# Patient Record
Sex: MALE | Race: ASIAN | Employment: FULL TIME | URBAN - METROPOLITAN AREA
[De-identification: names, ages, dates, MRNs, and addresses within clinical notes are randomized per-mention and may not be internally consistent; named-entity substitution may affect disease eponyms.]

---

## 2017-01-26 ENCOUNTER — OFFICE VISIT (OUTPATIENT)
Dept: FAMILY MEDICINE CLINIC | Facility: CLINIC | Age: 32
End: 2017-01-26

## 2017-01-26 VITALS
TEMPERATURE: 99 F | SYSTOLIC BLOOD PRESSURE: 126 MMHG | HEART RATE: 91 BPM | HEIGHT: 67 IN | WEIGHT: 183.13 LBS | DIASTOLIC BLOOD PRESSURE: 70 MMHG | BODY MASS INDEX: 28.74 KG/M2 | OXYGEN SATURATION: 98 % | RESPIRATION RATE: 16 BRPM

## 2017-01-26 DIAGNOSIS — A08.4 VIRAL GASTROENTERITIS: Primary | ICD-10-CM

## 2017-01-26 PROCEDURE — 99213 OFFICE O/P EST LOW 20 MIN: CPT | Performed by: FAMILY MEDICINE

## 2017-01-26 RX ORDER — ONDANSETRON 4 MG/1
4 TABLET, FILM COATED ORAL EVERY 8 HOURS PRN
Qty: 20 TABLET | Refills: 0 | Status: SHIPPED | OUTPATIENT
Start: 2017-01-26 | End: 2018-01-30 | Stop reason: ALTCHOICE

## 2017-01-26 NOTE — PROGRESS NOTES
HPI:    Patient ID: Shaila Haque is a 33year old male. HPI  57-year-old male presents with complaints of nausea and numerous episodes of loose/watery diarrhea ×1 day. Denies any blood in stools. No vomiting.   Has been trying to push fluid sounds normal. No respiratory distress. He has no wheezes. He has no rales. Abdominal: Soft. Bowel sounds are normal. He exhibits no distension. There is no tenderness. There is no rebound and no guarding.    Neurological: He is alert and oriented to pers

## 2017-01-26 NOTE — PATIENT INSTRUCTIONS
Treating Diarrhea    Diarrhea happens when you have loose, watery, or frequent bowel movements. It is a common problem with many causes. Most cases of diarrhea clear up on their own. But certain cases may need treatment.  Be sure to see your healthcare pr © 3511-9662 [address], [address] [place]. All rights reserved. This information is not intended as a substitute for professional medical care. Always follow your healthcare professional's instructions.         Viral G · Wash your hands after using cutting boards, countertops, knives, or utensils that have been in contact with raw food. · Keep uncooked meats away from cooked and ready-to-eat foods.   Medicine  You may use acetaminophen or NSAID medicines like ibuprofen o During the next 24 hours (the second day), you may add the following to the above:  · Hot cereal, plain toast, bread, rolls, and crackers  · Plain noodles, rice, mashed potatoes, chicken noodle or rice soup  · Unsweetened canned fruit (avoid pineapple), ba © 6821-2418 [address], [address] [place]. All rights reserved. This information is not intended as a substitute for professional medical care. Always follow your healthcare professional's instructions.

## 2018-01-26 ENCOUNTER — TELEPHONE (OUTPATIENT)
Dept: FAMILY MEDICINE CLINIC | Facility: CLINIC | Age: 33
End: 2018-01-26

## 2018-01-26 NOTE — TELEPHONE ENCOUNTER
Pt wants to schedule mole removal for next week said he discussed this with  previously?  does pt need to come back to consult or can I just schedule removal?

## 2018-01-30 ENCOUNTER — OFFICE VISIT (OUTPATIENT)
Dept: FAMILY MEDICINE CLINIC | Facility: CLINIC | Age: 33
End: 2018-01-30

## 2018-01-30 VITALS
HEART RATE: 81 BPM | RESPIRATION RATE: 18 BRPM | OXYGEN SATURATION: 98 % | WEIGHT: 204 LBS | TEMPERATURE: 98 F | BODY MASS INDEX: 32.02 KG/M2 | HEIGHT: 67 IN | SYSTOLIC BLOOD PRESSURE: 134 MMHG | DIASTOLIC BLOOD PRESSURE: 82 MMHG

## 2018-01-30 DIAGNOSIS — D22.9 CHANGE IN MOLE: ICD-10-CM

## 2018-01-30 DIAGNOSIS — D22.72 ATYPICAL NEVUS OF LEFT LOWER LEG: Primary | ICD-10-CM

## 2018-01-30 PROCEDURE — 99213 OFFICE O/P EST LOW 20 MIN: CPT | Performed by: FAMILY MEDICINE

## 2018-01-30 PROCEDURE — 88305 TISSUE EXAM BY PATHOLOGIST: CPT | Performed by: FAMILY MEDICINE

## 2018-01-30 PROCEDURE — 11401 EXC TR-EXT B9+MARG 0.6-1 CM: CPT | Performed by: FAMILY MEDICINE

## 2018-01-30 RX ORDER — LIDOCAINE AND PRILOCAINE 25; 25 MG/G; MG/G
CREAM TOPICAL
COMMUNITY
Start: 2018-01-29 | End: 2018-05-21 | Stop reason: ALTCHOICE

## 2018-01-31 NOTE — PROGRESS NOTES
HPI:    Patient ID: Jose Kasper is a 56 [address] old male. HPI  31yr old male presents with concerns about changing mole on his LLE. States he has had it for months but has noted it change in size and have variation in color.  Denies any pain, i complications  - specimen sent to pathology  - post procedure care instructions discussed with pt  - pt understands and agrees with tx plan  - RTC in 8-10d for path review and suture removal, sooner if needed      Orders Placed This Encounter      Specimen

## 2018-03-06 ENCOUNTER — TELEPHONE (OUTPATIENT)
Dept: FAMILY MEDICINE CLINIC | Facility: CLINIC | Age: 33
End: 2018-03-06

## 2018-05-21 ENCOUNTER — OFFICE VISIT (OUTPATIENT)
Dept: FAMILY MEDICINE CLINIC | Facility: CLINIC | Age: 33
End: 2018-05-21

## 2018-05-21 VITALS
HEART RATE: 77 BPM | OXYGEN SATURATION: 99 % | RESPIRATION RATE: 16 BRPM | HEIGHT: 67.5 IN | TEMPERATURE: 99 F | SYSTOLIC BLOOD PRESSURE: 134 MMHG | DIASTOLIC BLOOD PRESSURE: 82 MMHG | BODY MASS INDEX: 31.24 KG/M2 | WEIGHT: 201.38 LBS

## 2018-05-21 DIAGNOSIS — J30.2 SEASONAL ALLERGIC RHINITIS, UNSPECIFIED TRIGGER: ICD-10-CM

## 2018-05-21 DIAGNOSIS — R06.83 SNORING: ICD-10-CM

## 2018-05-21 DIAGNOSIS — Z00.00 LABORATORY EXAMINATION ORDERED AS PART OF A COMPLETE PHYSICAL EXAMINATION: ICD-10-CM

## 2018-05-21 DIAGNOSIS — J32.2 CHRONIC ETHMOIDAL SINUSITIS: ICD-10-CM

## 2018-05-21 DIAGNOSIS — G47.30 SLEEP DISORDER BREATHING: ICD-10-CM

## 2018-05-21 DIAGNOSIS — Z00.00 ROUTINE GENERAL MEDICAL EXAMINATION AT A HEALTH CARE FACILITY: Primary | ICD-10-CM

## 2018-05-21 DIAGNOSIS — E66.9 OBESITY (BMI 30.0-34.9): ICD-10-CM

## 2018-05-21 DIAGNOSIS — Z13.21 ENCOUNTER FOR VITAMIN DEFICIENCY SCREENING: ICD-10-CM

## 2018-05-21 PROCEDURE — 99395 PREV VISIT EST AGE 18-39: CPT | Performed by: FAMILY MEDICINE

## 2018-05-21 RX ORDER — FLUTICASONE PROPIONATE 50 MCG
SPRAY, SUSPENSION (ML) NASAL
COMMUNITY
Start: 2018-05-07 | End: 2019-08-26 | Stop reason: ALTCHOICE

## 2018-05-21 NOTE — PROGRESS NOTES
Patient presents with:  Physical:    Snoring: takes afrin      HPI:  26yr old obese male with chronic sinus symptoms presents for routine adult physical, c/o fatigue and snoring. Chronic sinusitis, takes antihistamine and flonase regularly.  Occasionally status: Never Smoker                                                              Smokeless tobacco: Never Used                      Alcohol use:  No                  Current Outpatient Prescriptions:  Fluticasone Propionate 50 MCG/ACT Nasal Suspension  Dis 30.0-34. 9)  Chronic ethmoidal sinusitis  Seasonal allergic rhinitis, unspecified trigger  - reviewed healthy eating habits, regular exercise, BMI goals, weight loss, skin care and preventative guidelines based on age  - will check fasting labs  - snoring/s

## 2018-05-22 ENCOUNTER — LAB ENCOUNTER (OUTPATIENT)
Dept: LAB | Age: 33
End: 2018-05-22
Attending: FAMILY MEDICINE
Payer: COMMERCIAL

## 2018-05-22 DIAGNOSIS — Z13.21 ENCOUNTER FOR VITAMIN DEFICIENCY SCREENING: ICD-10-CM

## 2018-05-22 DIAGNOSIS — E66.9 OBESITY (BMI 30.0-34.9): ICD-10-CM

## 2018-05-22 DIAGNOSIS — Z00.00 LABORATORY EXAMINATION ORDERED AS PART OF A COMPLETE PHYSICAL EXAMINATION: ICD-10-CM

## 2018-05-22 PROCEDURE — 85025 COMPLETE CBC W/AUTO DIFF WBC: CPT

## 2018-05-22 PROCEDURE — 36415 COLL VENOUS BLD VENIPUNCTURE: CPT

## 2018-05-22 PROCEDURE — 84443 ASSAY THYROID STIM HORMONE: CPT

## 2018-05-22 PROCEDURE — 80053 COMPREHEN METABOLIC PANEL: CPT

## 2018-05-22 PROCEDURE — 82306 VITAMIN D 25 HYDROXY: CPT

## 2018-05-22 PROCEDURE — 80061 LIPID PANEL: CPT

## 2018-05-25 ENCOUNTER — TELEPHONE (OUTPATIENT)
Dept: FAMILY MEDICINE CLINIC | Facility: CLINIC | Age: 33
End: 2018-05-25

## 2018-05-25 NOTE — TELEPHONE ENCOUNTER
----- Message from [facility], DO sent at 5/25/2018  1:27 PM CDT -----  Pls have pt f/u on labs and plan of care.

## 2018-06-14 ENCOUNTER — HOSPITAL ENCOUNTER (OUTPATIENT)
Dept: GENERAL RADIOLOGY | Facility: HOSPITAL | Age: 33
Discharge: HOME | End: 2018-06-14
Attending: PREVENTIVE MEDICINE

## 2018-06-14 ENCOUNTER — EMPLOYEE HEALTH (OUTPATIENT)
Dept: OTHER | Facility: HOSPITAL | Age: 33
End: 2018-06-14
Attending: PREVENTIVE MEDICINE

## 2018-06-14 DIAGNOSIS — Z11.1 SCREENING-PULMONARY TB: ICD-10-CM

## 2018-06-14 DIAGNOSIS — Z11.1 SCREENING-PULMONARY TB: Primary | ICD-10-CM

## 2018-07-06 NOTE — TELEPHONE ENCOUNTER
Pt did not come back to discuss his labs    PLease try contacting pt again to schedule    No future appointments.

## 2018-07-10 ENCOUNTER — APPOINTMENT (OUTPATIENT)
Dept: LAB | Age: 33
End: 2018-07-10
Attending: FAMILY MEDICINE
Payer: COMMERCIAL

## 2018-07-10 ENCOUNTER — OFFICE VISIT (OUTPATIENT)
Dept: FAMILY MEDICINE CLINIC | Facility: CLINIC | Age: 33
End: 2018-07-10

## 2018-07-10 VITALS
HEIGHT: 67.5 IN | DIASTOLIC BLOOD PRESSURE: 64 MMHG | WEIGHT: 197.63 LBS | SYSTOLIC BLOOD PRESSURE: 118 MMHG | OXYGEN SATURATION: 98 % | TEMPERATURE: 98 F | BODY MASS INDEX: 30.66 KG/M2 | HEART RATE: 74 BPM | RESPIRATION RATE: 16 BRPM

## 2018-07-10 DIAGNOSIS — Z82.49 FAMILY HISTORY OF PREMATURE CAD: ICD-10-CM

## 2018-07-10 DIAGNOSIS — Z11.1 SCREENING FOR TUBERCULOSIS: ICD-10-CM

## 2018-07-10 DIAGNOSIS — E55.9 VITAMIN D DEFICIENCY: ICD-10-CM

## 2018-07-10 DIAGNOSIS — E78.2 MIXED HYPERLIPIDEMIA: Primary | ICD-10-CM

## 2018-07-10 DIAGNOSIS — E66.9 OBESITY (BMI 30.0-34.9): ICD-10-CM

## 2018-07-10 DIAGNOSIS — Z31.41 FERTILITY TESTING: ICD-10-CM

## 2018-07-10 DIAGNOSIS — Z92.29 HISTORY OF BCG VACCINATION: ICD-10-CM

## 2018-07-10 PROCEDURE — 36415 COLL VENOUS BLD VENIPUNCTURE: CPT

## 2018-07-10 PROCEDURE — 99214 OFFICE O/P EST MOD 30 MIN: CPT | Performed by: FAMILY MEDICINE

## 2018-07-10 PROCEDURE — 86480 TB TEST CELL IMMUN MEASURE: CPT

## 2018-07-10 RX ORDER — ERGOCALCIFEROL 1.25 MG/1
50000 CAPSULE ORAL WEEKLY
Qty: 12 CAPSULE | Refills: 0 | Status: SHIPPED | OUTPATIENT
Start: 2018-07-10 | End: 2018-08-09

## 2018-07-10 RX ORDER — ROSUVASTATIN CALCIUM 5 MG/1
5 TABLET, COATED ORAL NIGHTLY
Qty: 30 TABLET | Refills: 2 | Status: SHIPPED | OUTPATIENT
Start: 2018-07-10 | End: 2019-07-15 | Stop reason: ALTCHOICE

## 2018-07-10 RX ORDER — ACETAMINOPHEN 160 MG
2000 TABLET,DISINTEGRATING ORAL DAILY
COMMUNITY
End: 2019-07-29 | Stop reason: ALTCHOICE

## 2018-07-10 NOTE — PROGRESS NOTES
HPI:    Patient ID: Vijaya Fox is a 26year old male. HPI   33yr old obese, male presents for f/u on previous lab results and states he had a positive PPD test done at occupational health with neg CXR.  He needs TB gold done for further enoch Skin: Skin is warm and dry. Psychiatric: He has a normal mood and affect. His behavior is normal.   Nursing note and vitals reviewed. ASSESSMENT/PLAN:   1. Mixed hyperlipidemia  2. Family history of premature CAD  - lipid panel (5/22/18):  Corinne Teran

## 2018-07-13 LAB
M TB TUBERC IFN-G BLD QL: NEGATIVE
M TB TUBERC IFN-G/MITOGEN IGNF BLD: <0 IU/ML
M TB TUBERC IGNF/MITOGEN IGNF CONTROL: >10 IU/ML
MITOGEN IGNF BCKGRD COR BLD-ACNC: 0.03 IU/ML

## 2018-07-17 ENCOUNTER — TELEPHONE (OUTPATIENT)
Dept: FAMILY MEDICINE CLINIC | Facility: CLINIC | Age: 33
End: 2018-07-17

## 2018-07-17 NOTE — TELEPHONE ENCOUNTER
----- Message from [facility], DO sent at 7/13/2018 11:46 AM CDT -----  Pls inform pt that TB gold neg.

## 2019-07-03 ENCOUNTER — EMPLOYEE HEALTH (OUTPATIENT)
Dept: OTHER | Facility: HOSPITAL | Age: 34
End: 2019-07-03
Attending: PREVENTIVE MEDICINE

## 2019-07-03 DIAGNOSIS — Z11.1 SCREENING-PULMONARY TB: Primary | ICD-10-CM

## 2019-07-03 PROCEDURE — 86480 TB TEST CELL IMMUN MEASURE: CPT

## 2019-07-09 LAB
M TB TUBERC IFN-G BLD QL: NEGATIVE
M TB TUBERC IFN-G/MITOGEN IGNF BLD: 0.02 IU/ML
M TB TUBERC IFN-G/MITOGEN IGNF BLD: 0.03 IU/ML
M TB TUBERC IGNF/MITOGEN IGNF CONTROL: >10 IU/ML
MITOGEN IGNF BCKGRD COR BLD-ACNC: 0.05 IU/ML

## 2019-07-15 ENCOUNTER — OFFICE VISIT (OUTPATIENT)
Dept: FAMILY MEDICINE CLINIC | Facility: CLINIC | Age: 34
End: 2019-07-15
Payer: COMMERCIAL

## 2019-07-15 VITALS
BODY MASS INDEX: 31.18 KG/M2 | DIASTOLIC BLOOD PRESSURE: 88 MMHG | RESPIRATION RATE: 18 BRPM | SYSTOLIC BLOOD PRESSURE: 132 MMHG | WEIGHT: 201 LBS | OXYGEN SATURATION: 98 % | TEMPERATURE: 98 F | HEART RATE: 69 BPM | HEIGHT: 67.5 IN

## 2019-07-15 DIAGNOSIS — Z00.00 ROUTINE GENERAL MEDICAL EXAMINATION AT A HEALTH CARE FACILITY: Primary | ICD-10-CM

## 2019-07-15 DIAGNOSIS — Z00.00 LABORATORY EXAM ORDERED AS PART OF ROUTINE GENERAL MEDICAL EXAMINATION: ICD-10-CM

## 2019-07-15 DIAGNOSIS — E55.9 VITAMIN D DEFICIENCY: ICD-10-CM

## 2019-07-15 DIAGNOSIS — E66.9 OBESITY (BMI 30.0-34.9): ICD-10-CM

## 2019-07-15 DIAGNOSIS — J30.2 SEASONAL ALLERGIC RHINITIS, UNSPECIFIED TRIGGER: ICD-10-CM

## 2019-07-15 DIAGNOSIS — R73.01 IMPAIRED FASTING GLUCOSE: ICD-10-CM

## 2019-07-15 DIAGNOSIS — E78.2 MIXED HYPERLIPIDEMIA: ICD-10-CM

## 2019-07-15 PROCEDURE — 99395 PREV VISIT EST AGE 18-39: CPT | Performed by: FAMILY MEDICINE

## 2019-07-15 NOTE — PATIENT INSTRUCTIONS
Prevention Guidelines, Men Ages 25 to 44  Screening tests and vaccines are an important part of managing your health. A screening test is done to find possible disorders or diseases in people who don't have any symptoms.  The goal is to find a disease ear Vaccines Who needs it How often   Chickenpox (varicella) All men in this age group who have no record of this infection or vaccine 2 doses; the second dose should be given at least 4 weeks after the first dose   Hepatitis A Men at increased risk for infect Sexually transmitted infection prevention Men who are sexually active At routine exams   Skin cancer Prevention of skin cancer in fair-skinned adults through age 25 At routine exams   1Those who are 25years of age, who are not up-to-date on their childhoo Total cholesterol includes LDL and HDL cholesterol, as well as other fats in the bloodstream. If your total cholesterol is high, follow the steps below to help lower your total cholesterol level:  Eat less unhealthy fat  · Cut back on saturated fats and tr Many people need medicine to get their LDL levels to a safe level. Medicine to lower cholesterol levels is effective and safe. Taking medicine is not a substitute for exercise or watching your diet!  Your healthcare provider can tell you whether you might b

## 2019-07-15 NOTE — PROGRESS NOTES
Patient presents with:  Physical      HPI:  34yr old obese, male with HL, vit d deficiency and allergic rhinitis presents for routine adult physical.   Obesity, has not been watching his diet nor exercising. Eating out often.   HL, had tried statin for a fe No        Current Outpatient Medications:  Vitamin D3 2000 units Oral Cap Take 2,000 Units by mouth daily.  Disp:  Rfl:    Fluticasone Propionate 50 MCG/ACT Nasal Suspension  Disp:  Rfl:          Shrimp                  ANAPHYLAXIS    Health Maintenance: preventative guidelines based on age  - will check fasting labs  - HL, low fat/chol diet and regular exercise reiterated, will check lipid panel  - check a1c for previous elevated fasting glucose at 100  - cont vit d3 2000u daily otc  - allergic rhinitis,

## 2019-07-18 ENCOUNTER — LAB ENCOUNTER (OUTPATIENT)
Dept: LAB | Age: 34
End: 2019-07-18
Attending: FAMILY MEDICINE
Payer: COMMERCIAL

## 2019-07-18 DIAGNOSIS — Z00.00 LABORATORY EXAM ORDERED AS PART OF ROUTINE GENERAL MEDICAL EXAMINATION: ICD-10-CM

## 2019-07-18 DIAGNOSIS — E55.9 VITAMIN D DEFICIENCY: ICD-10-CM

## 2019-07-18 DIAGNOSIS — R73.01 IMPAIRED FASTING GLUCOSE: ICD-10-CM

## 2019-07-18 DIAGNOSIS — E78.2 MIXED HYPERLIPIDEMIA: ICD-10-CM

## 2019-07-18 LAB
ALBUMIN SERPL-MCNC: 4.1 G/DL (ref 3.4–5)
ALBUMIN/GLOB SERPL: 1 {RATIO} (ref 1–2)
ALP LIVER SERPL-CCNC: 65 U/L (ref 45–117)
ALT SERPL-CCNC: 61 U/L (ref 16–61)
ANION GAP SERPL CALC-SCNC: 6 MMOL/L (ref 0–18)
AST SERPL-CCNC: 27 U/L (ref 15–37)
BASOPHILS # BLD AUTO: 0.07 X10(3) UL (ref 0–0.2)
BASOPHILS NFR BLD AUTO: 1 %
BILIRUB SERPL-MCNC: 0.5 MG/DL (ref 0.1–2)
BUN BLD-MCNC: 13 MG/DL (ref 7–18)
BUN/CREAT SERPL: 12.7 (ref 10–20)
CALCIUM BLD-MCNC: 9.3 MG/DL (ref 8.5–10.1)
CHLORIDE SERPL-SCNC: 104 MMOL/L (ref 98–112)
CHOLEST SMN-MCNC: 206 MG/DL (ref ?–200)
CO2 SERPL-SCNC: 29 MMOL/L (ref 21–32)
CREAT BLD-MCNC: 1.02 MG/DL (ref 0.7–1.3)
DEPRECATED RDW RBC AUTO: 40.8 FL (ref 35.1–46.3)
EOSINOPHIL # BLD AUTO: 0.19 X10(3) UL (ref 0–0.7)
EOSINOPHIL NFR BLD AUTO: 2.7 %
ERYTHROCYTE [DISTWIDTH] IN BLOOD BY AUTOMATED COUNT: 12.9 % (ref 11–15)
EST. AVERAGE GLUCOSE BLD GHB EST-MCNC: 117 MG/DL (ref 68–126)
GLOBULIN PLAS-MCNC: 4.1 G/DL (ref 2.8–4.4)
GLUCOSE BLD-MCNC: 95 MG/DL (ref 70–99)
HBA1C MFR BLD HPLC: 5.7 % (ref ?–5.7)
HCT VFR BLD AUTO: 47.4 % (ref 39–53)
HDLC SERPL-MCNC: 47 MG/DL (ref 40–59)
HGB BLD-MCNC: 16 G/DL (ref 13–17.5)
IMM GRANULOCYTES # BLD AUTO: 0.02 X10(3) UL (ref 0–1)
IMM GRANULOCYTES NFR BLD: 0.3 %
LDLC SERPL CALC-MCNC: 128 MG/DL (ref ?–100)
LYMPHOCYTES # BLD AUTO: 3.05 X10(3) UL (ref 1–4)
LYMPHOCYTES NFR BLD AUTO: 43.3 %
M PROTEIN MFR SERPL ELPH: 8.2 G/DL (ref 6.4–8.2)
MCH RBC QN AUTO: 29.4 PG (ref 26–34)
MCHC RBC AUTO-ENTMCNC: 33.8 G/DL (ref 31–37)
MCV RBC AUTO: 87 FL (ref 80–100)
MONOCYTES # BLD AUTO: 0.46 X10(3) UL (ref 0.1–1)
MONOCYTES NFR BLD AUTO: 6.5 %
NEUTROPHILS # BLD AUTO: 3.26 X10 (3) UL (ref 1.5–7.7)
NEUTROPHILS # BLD AUTO: 3.26 X10(3) UL (ref 1.5–7.7)
NEUTROPHILS NFR BLD AUTO: 46.2 %
NONHDLC SERPL-MCNC: 159 MG/DL (ref ?–130)
OSMOLALITY SERPL CALC.SUM OF ELEC: 288 MOSM/KG (ref 275–295)
PLATELET # BLD AUTO: 257 10(3)UL (ref 150–450)
POTASSIUM SERPL-SCNC: 4.1 MMOL/L (ref 3.5–5.1)
RBC # BLD AUTO: 5.45 X10(6)UL (ref 4.3–5.7)
SODIUM SERPL-SCNC: 139 MMOL/L (ref 136–145)
T4 FREE SERPL-MCNC: 1.1 NG/DL (ref 0.8–1.7)
TRIGL SERPL-MCNC: 157 MG/DL (ref 30–149)
TSI SER-ACNC: 3.87 MIU/ML (ref 0.36–3.74)
VIT D+METAB SERPL-MCNC: 15.6 NG/ML (ref 30–100)
VLDLC SERPL CALC-MCNC: 31 MG/DL (ref 0–30)
WBC # BLD AUTO: 7.1 X10(3) UL (ref 4–11)

## 2019-07-18 PROCEDURE — 83036 HEMOGLOBIN GLYCOSYLATED A1C: CPT

## 2019-07-18 PROCEDURE — 80053 COMPREHEN METABOLIC PANEL: CPT

## 2019-07-18 PROCEDURE — 84443 ASSAY THYROID STIM HORMONE: CPT

## 2019-07-18 PROCEDURE — 80061 LIPID PANEL: CPT

## 2019-07-18 PROCEDURE — 82306 VITAMIN D 25 HYDROXY: CPT

## 2019-07-18 PROCEDURE — 36415 COLL VENOUS BLD VENIPUNCTURE: CPT

## 2019-07-18 PROCEDURE — 85025 COMPLETE CBC W/AUTO DIFF WBC: CPT

## 2019-07-18 PROCEDURE — 84439 ASSAY OF FREE THYROXINE: CPT

## 2019-07-29 ENCOUNTER — OFFICE VISIT (OUTPATIENT)
Dept: FAMILY MEDICINE CLINIC | Facility: CLINIC | Age: 34
End: 2019-07-29
Payer: COMMERCIAL

## 2019-07-29 VITALS
OXYGEN SATURATION: 97 % | BODY MASS INDEX: 31.02 KG/M2 | WEIGHT: 200 LBS | SYSTOLIC BLOOD PRESSURE: 120 MMHG | RESPIRATION RATE: 18 BRPM | TEMPERATURE: 98 F | HEART RATE: 67 BPM | HEIGHT: 67.5 IN | DIASTOLIC BLOOD PRESSURE: 88 MMHG

## 2019-07-29 DIAGNOSIS — R79.89 ABNORMAL TSH: ICD-10-CM

## 2019-07-29 DIAGNOSIS — E55.9 VITAMIN D DEFICIENCY: ICD-10-CM

## 2019-07-29 DIAGNOSIS — R73.03 PREDIABETES: ICD-10-CM

## 2019-07-29 DIAGNOSIS — E66.9 OBESITY (BMI 30.0-34.9): Primary | ICD-10-CM

## 2019-07-29 DIAGNOSIS — E78.2 MIXED HYPERLIPIDEMIA: ICD-10-CM

## 2019-07-29 PROCEDURE — 93000 ELECTROCARDIOGRAM COMPLETE: CPT | Performed by: FAMILY MEDICINE

## 2019-07-29 PROCEDURE — 99214 OFFICE O/P EST MOD 30 MIN: CPT | Performed by: FAMILY MEDICINE

## 2019-07-29 RX ORDER — PHENTERMINE HYDROCHLORIDE 15 MG/1
15 CAPSULE ORAL EVERY MORNING
Qty: 30 CAPSULE | Refills: 0 | Status: SHIPPED | OUTPATIENT
Start: 2019-07-29 | End: 2019-08-26 | Stop reason: DRUGHIGH

## 2019-07-29 RX ORDER — ERGOCALCIFEROL 1.25 MG/1
50000 CAPSULE ORAL WEEKLY
Qty: 4 CAPSULE | Refills: 2 | Status: SHIPPED | OUTPATIENT
Start: 2019-07-29 | End: 2019-08-28

## 2019-07-29 NOTE — PATIENT INSTRUCTIONS
Controlling Your Cholesterol  Cholesterol is a waxy substance. It travels in your blood through the blood vessels. When you have high cholesterol, it can build up along the walls of the blood vessels.  This makes the vessels narrower and decreases blood f · Choose an activity you enjoy. Walking, swimming, and riding a bike are some good ways to be active. · Start at a level where you feel comfortable. Increase your time and pace a little each week.   · Work up to 30 to 40 minutes of moderate to high intensi Exercise with a friend. When activity is fun, you're more likely to stick with it. Calories and weight loss  · Calories are the fuel your body burns for energy. You get the calories you need from the food you eat.  For healthy weight loss, women should ea © 0450-5358 [address]. [address] [facility] [place]. All rights reserved. This information is not intended as a substitute for professional medical care. Always follow your healthcare professional's instructions.

## 2019-07-29 NOTE — PROGRESS NOTES
HPI:    Patient ID: Josh Barlow is a 37year old male. HPI   34yr old male presents for f/u on recent labs and wt gain. Obesity, has been struggling to lose weight. States he [employer]. \" Working 2 full-time jobs and doing school, RACHEL Garcia phentermine  - will start phentermine 15mg po qam, reviewed indications, dosing and SEs, instructed to stop phentermine if he develops any shortness of breath, chest pain, swelling of ankles and feet, flluttering of heart, confusion, and high blood pressur

## 2019-08-26 ENCOUNTER — OFFICE VISIT (OUTPATIENT)
Dept: FAMILY MEDICINE CLINIC | Facility: CLINIC | Age: 34
End: 2019-08-26
Payer: COMMERCIAL

## 2019-08-26 VITALS
DIASTOLIC BLOOD PRESSURE: 80 MMHG | OXYGEN SATURATION: 99 % | HEART RATE: 68 BPM | TEMPERATURE: 98 F | HEIGHT: 67.5 IN | WEIGHT: 195 LBS | SYSTOLIC BLOOD PRESSURE: 135 MMHG | BODY MASS INDEX: 30.25 KG/M2 | RESPIRATION RATE: 18 BRPM

## 2019-08-26 DIAGNOSIS — K21.9 GASTROESOPHAGEAL REFLUX DISEASE, ESOPHAGITIS PRESENCE NOT SPECIFIED: ICD-10-CM

## 2019-08-26 DIAGNOSIS — E66.9 OBESITY (BMI 30.0-34.9): Primary | ICD-10-CM

## 2019-08-26 DIAGNOSIS — Z51.81 THERAPEUTIC DRUG MONITORING: ICD-10-CM

## 2019-08-26 PROCEDURE — 99214 OFFICE O/P EST MOD 30 MIN: CPT | Performed by: FAMILY MEDICINE

## 2019-08-26 RX ORDER — PHENTERMINE HYDROCHLORIDE 37.5 MG/1
37.5 CAPSULE ORAL EVERY MORNING
Qty: 30 CAPSULE | Refills: 0 | Status: SHIPPED | OUTPATIENT
Start: 2019-08-26 | End: 2019-09-23

## 2019-08-26 NOTE — PROGRESS NOTES
HPI:    Patient ID: Connie Vidal is a 37year old male. HPI   34yr old male presents for f/u on his obesity, refill on phentermine and c/o heartburn. Obesity, has been taking phentermine 15mg po daily without any significant SEs.  Minimal dry fatou reviewed indications, dosing and SEs  - advised to increase hydration, regular exercise and healthier eating habits  - monitor for any SEs  - Phentermine HCl 37.5 MG Oral Cap; Take 1 capsule (37.5 mg total) by mouth every morning. Dispense: 30 capsule;  Re

## 2019-09-23 ENCOUNTER — OFFICE VISIT (OUTPATIENT)
Dept: FAMILY MEDICINE CLINIC | Facility: CLINIC | Age: 34
End: 2019-09-23
Payer: COMMERCIAL

## 2019-09-23 VITALS
TEMPERATURE: 98 F | DIASTOLIC BLOOD PRESSURE: 78 MMHG | HEART RATE: 82 BPM | SYSTOLIC BLOOD PRESSURE: 134 MMHG | RESPIRATION RATE: 20 BRPM | WEIGHT: 183 LBS | BODY MASS INDEX: 28.39 KG/M2 | OXYGEN SATURATION: 98 % | HEIGHT: 67.5 IN

## 2019-09-23 DIAGNOSIS — E66.3 OVERWEIGHT (BMI 25.0-29.9): Primary | ICD-10-CM

## 2019-09-23 DIAGNOSIS — Z51.81 THERAPEUTIC DRUG MONITORING: ICD-10-CM

## 2019-09-23 DIAGNOSIS — L21.9 SEBORRHEIC DERMATITIS: ICD-10-CM

## 2019-09-23 PROCEDURE — 99214 OFFICE O/P EST MOD 30 MIN: CPT | Performed by: FAMILY MEDICINE

## 2019-09-23 RX ORDER — KETOCONAZOLE 20 MG/ML
SHAMPOO TOPICAL
Qty: 120 ML | Refills: 1 | Status: SHIPPED | OUTPATIENT
Start: 2019-09-23 | End: 2019-11-25

## 2019-09-23 RX ORDER — PHENTERMINE HYDROCHLORIDE 37.5 MG/1
37.5 CAPSULE ORAL EVERY MORNING
Qty: 30 CAPSULE | Refills: 0 | Status: SHIPPED | OUTPATIENT
Start: 2019-09-23 | End: 2019-10-21

## 2019-09-23 RX ORDER — ERGOCALCIFEROL 1.25 MG/1
CAPSULE ORAL
Refills: 2 | COMMUNITY
Start: 2019-08-30 | End: 2020-10-23

## 2019-09-23 NOTE — PROGRESS NOTES
HPI:    Patient ID: Lawrence Mijares is a 37year old male. HPI   34yr old male presents for f/u on wt loss and c/o dry scalp. Has been working on wt loss with phentermine.  Taking phentermine 37.5mg daily, which has helped suppress his appetite sign Phentermine HCl 37.5 MG Oral Cap; Take 1 capsule (37.5 mg total) by mouth every morning. Dispense: 30 capsule; Refill: 0    3.  Seborrheic dermatitis  - cont current shampoo for seborrheic dermatitis  - will tx with ketoconazole shampoo, reviewed indicatio

## 2019-10-21 ENCOUNTER — OFFICE VISIT (OUTPATIENT)
Dept: FAMILY MEDICINE CLINIC | Facility: CLINIC | Age: 34
End: 2019-10-21
Payer: COMMERCIAL

## 2019-10-21 VITALS
WEIGHT: 184.5 LBS | BODY MASS INDEX: 28.62 KG/M2 | HEIGHT: 67.5 IN | SYSTOLIC BLOOD PRESSURE: 126 MMHG | RESPIRATION RATE: 16 BRPM | OXYGEN SATURATION: 99 % | HEART RATE: 102 BPM | TEMPERATURE: 98 F | DIASTOLIC BLOOD PRESSURE: 82 MMHG

## 2019-10-21 DIAGNOSIS — L21.9 SEBORRHEIC DERMATITIS: ICD-10-CM

## 2019-10-21 DIAGNOSIS — Z51.81 THERAPEUTIC DRUG MONITORING: ICD-10-CM

## 2019-10-21 DIAGNOSIS — K21.9 GASTROESOPHAGEAL REFLUX DISEASE, ESOPHAGITIS PRESENCE NOT SPECIFIED: ICD-10-CM

## 2019-10-21 DIAGNOSIS — E66.3 OVERWEIGHT (BMI 25.0-29.9): Primary | ICD-10-CM

## 2019-10-21 PROCEDURE — 99214 OFFICE O/P EST MOD 30 MIN: CPT | Performed by: FAMILY MEDICINE

## 2019-10-21 RX ORDER — PHENTERMINE HYDROCHLORIDE 37.5 MG/1
37.5 CAPSULE ORAL EVERY MORNING
Qty: 30 CAPSULE | Refills: 0 | Status: SHIPPED | OUTPATIENT
Start: 2019-10-21 | End: 2019-11-25

## 2019-10-21 NOTE — PROGRESS NOTES
HPI:    Patient ID: Varghese Coppola is a 37year old male. HPI   34yr old male presents for f/u on overweight, seborrheic dermatitis and GERD. Has been taking phentermine but admits to missing a few doses. No significant SEs noted.  Mother-in-law vi provide refill for phentermine 37.5mg po qam, no significant SEs noted  - monitor for any SEs  - Phentermine HCl 37.5 MG Oral Cap; Take 1 capsule (37.5 mg total) by mouth every morning. Dispense: 30 capsule; Refill: 0    3.  Seborrheic dermatitis  - improv

## 2019-11-25 ENCOUNTER — OFFICE VISIT (OUTPATIENT)
Dept: FAMILY MEDICINE CLINIC | Facility: CLINIC | Age: 34
End: 2019-11-25
Payer: COMMERCIAL

## 2019-11-25 VITALS
WEIGHT: 179.5 LBS | HEART RATE: 72 BPM | DIASTOLIC BLOOD PRESSURE: 80 MMHG | SYSTOLIC BLOOD PRESSURE: 124 MMHG | HEIGHT: 67 IN | RESPIRATION RATE: 16 BRPM | OXYGEN SATURATION: 97 % | BODY MASS INDEX: 28.17 KG/M2 | TEMPERATURE: 98 F

## 2019-11-25 DIAGNOSIS — L21.9 SEBORRHEIC DERMATITIS: ICD-10-CM

## 2019-11-25 DIAGNOSIS — E66.3 OVERWEIGHT (BMI 25.0-29.9): Primary | ICD-10-CM

## 2019-11-25 DIAGNOSIS — Z51.81 THERAPEUTIC DRUG MONITORING: ICD-10-CM

## 2019-11-25 DIAGNOSIS — B37.0 ORAL THRUSH: ICD-10-CM

## 2019-11-25 PROCEDURE — 99214 OFFICE O/P EST MOD 30 MIN: CPT | Performed by: FAMILY MEDICINE

## 2019-11-25 RX ORDER — CLOTRIMAZOLE 10 MG/1
10 LOZENGE ORAL; TOPICAL
Qty: 70 TABLET | Refills: 0 | Status: SHIPPED | OUTPATIENT
Start: 2019-11-25 | End: 2019-12-09

## 2019-11-25 RX ORDER — KETOCONAZOLE 20 MG/ML
SHAMPOO TOPICAL
Qty: 120 ML | Refills: 2 | Status: SHIPPED | OUTPATIENT
Start: 2019-11-25 | End: 2020-05-03

## 2019-11-25 RX ORDER — PHENTERMINE HYDROCHLORIDE 37.5 MG/1
37.5 CAPSULE ORAL EVERY MORNING
Qty: 30 CAPSULE | Refills: 0 | Status: SHIPPED | OUTPATIENT
Start: 2019-11-25 | End: 2019-12-23

## 2019-11-26 NOTE — PROGRESS NOTES
HPI:    Patient ID: Cristal Abebe is a 37year old male. HPI   34yr old male presents for f/u on wt loss, seborrheic dermatitis and c/o thrush. Taking phentermine daily to help with wt loss. Denies any SEs from medication.  Has been helping him c significant SEs noted   - advised healthy eating habits and regular exercise  - will monitor and likely only one more month of medication after this month  - Phentermine HCl 37.5 MG Oral Cap; Take 1 capsule (37.5 mg total) by mouth every morning.   Dispense

## 2019-12-23 ENCOUNTER — OFFICE VISIT (OUTPATIENT)
Dept: FAMILY MEDICINE CLINIC | Facility: CLINIC | Age: 34
End: 2019-12-23
Payer: COMMERCIAL

## 2019-12-23 VITALS
SYSTOLIC BLOOD PRESSURE: 120 MMHG | RESPIRATION RATE: 16 BRPM | BODY MASS INDEX: 27.78 KG/M2 | TEMPERATURE: 98 F | WEIGHT: 177 LBS | DIASTOLIC BLOOD PRESSURE: 80 MMHG | HEIGHT: 67 IN | OXYGEN SATURATION: 99 % | HEART RATE: 72 BPM

## 2019-12-23 DIAGNOSIS — E55.9 VITAMIN D DEFICIENCY: ICD-10-CM

## 2019-12-23 DIAGNOSIS — E66.3 OVERWEIGHT (BMI 25.0-29.9): ICD-10-CM

## 2019-12-23 DIAGNOSIS — R79.89 ABNORMAL TSH: ICD-10-CM

## 2019-12-23 DIAGNOSIS — R73.03 PREDIABETES: ICD-10-CM

## 2019-12-23 DIAGNOSIS — Z51.81 THERAPEUTIC DRUG MONITORING: Primary | ICD-10-CM

## 2019-12-23 DIAGNOSIS — E78.2 MIXED HYPERLIPIDEMIA: ICD-10-CM

## 2019-12-23 PROCEDURE — 99214 OFFICE O/P EST MOD 30 MIN: CPT | Performed by: FAMILY MEDICINE

## 2019-12-23 RX ORDER — PHENTERMINE HYDROCHLORIDE 37.5 MG/1
37.5 CAPSULE ORAL EVERY MORNING
Qty: 30 CAPSULE | Refills: 0 | Status: SHIPPED | OUTPATIENT
Start: 2019-12-23 | End: 2020-04-06

## 2019-12-23 NOTE — PATIENT INSTRUCTIONS
Controlling Your Cholesterol  Cholesterol is a waxy substance. It travels in your blood through the blood vessels. When you have high cholesterol, it can build up along the walls of the blood vessels.  This makes the vessels narrower and decreases blood f · Choose an activity you enjoy. Walking, swimming, and riding a bike are some good ways to be active. · Start at a level where you feel comfortable. Increase your time and pace a little each week.   · Work up to 30 to 40 minutes of moderate to high intensi You have been diagnosed with prediabetes. This means that the level of sugar (glucose) in your blood is too high. If you have prediabetes, you are at risk for developing type 2 diabetes.  Type 2 diabetes is diagnosed when the level of glucose in the blood r · Fasting glucose test. Blood is taken and tested after you have fasted (not eaten) for at least 8 hours. A normal test result is 99 milligrams per deciliter (mg/dL) or lower. Prediabetes is 100 mg/dL to 125 mg/dL. Diabetes is 126 mg/dL or higher.   · Gluco · Lose weight for no reason  · Feel numbness or tingling in your fingers or toes  · Have cuts or bruises that don’t seem to heal  · Have blurry vision  Date Last Reviewed: 5/1/2016  © 4712-2713 [address].  [address] [place] [address], [address]

## 2019-12-24 NOTE — PROGRESS NOTES
HPI:    Patient ID: Vanessa Miramontes is a 37year old male. HPI   34yr old male presents for f/u on his wt loss and chronic conditions. Has been taking phentermine for the past month. Denies any SEs from medication.    HL and prediabetes, working on provided  - cont healthy eating habits and regular exercise  - Phentermine HCl 37.5 MG Oral Cap; Take 1 capsule (37.5 mg total) by mouth every morning. Dispense: 30 capsule; Refill: 0    3.  Mixed hyperlipidemia  - cont low fat/chol diet and regular exerci

## 2020-01-17 ENCOUNTER — APPOINTMENT (OUTPATIENT)
Dept: LAB | Age: 35
End: 2020-01-17
Attending: FAMILY MEDICINE
Payer: COMMERCIAL

## 2020-01-17 DIAGNOSIS — R79.89 ABNORMAL TSH: ICD-10-CM

## 2020-01-17 DIAGNOSIS — E55.9 VITAMIN D DEFICIENCY: ICD-10-CM

## 2020-01-17 DIAGNOSIS — R73.03 PREDIABETES: ICD-10-CM

## 2020-01-17 DIAGNOSIS — E78.2 MIXED HYPERLIPIDEMIA: ICD-10-CM

## 2020-01-17 LAB
ALBUMIN SERPL-MCNC: 4 G/DL (ref 3.4–5)
ALBUMIN/GLOB SERPL: 0.9 {RATIO} (ref 1–2)
ALP LIVER SERPL-CCNC: 101 U/L (ref 45–117)
ALT SERPL-CCNC: 115 U/L (ref 16–61)
ANION GAP SERPL CALC-SCNC: 3 MMOL/L (ref 0–18)
AST SERPL-CCNC: 39 U/L (ref 15–37)
BILIRUB SERPL-MCNC: 0.4 MG/DL (ref 0.1–2)
BUN BLD-MCNC: 12 MG/DL (ref 7–18)
BUN/CREAT SERPL: 12 (ref 10–20)
CALCIUM BLD-MCNC: 9.4 MG/DL (ref 8.5–10.1)
CHLORIDE SERPL-SCNC: 106 MMOL/L (ref 98–112)
CHOLEST SMN-MCNC: 215 MG/DL (ref ?–200)
CO2 SERPL-SCNC: 31 MMOL/L (ref 21–32)
CREAT BLD-MCNC: 1 MG/DL (ref 0.7–1.3)
EST. AVERAGE GLUCOSE BLD GHB EST-MCNC: 111 MG/DL (ref 68–126)
GLOBULIN PLAS-MCNC: 4.5 G/DL (ref 2.8–4.4)
GLUCOSE BLD-MCNC: 89 MG/DL (ref 70–99)
HBA1C MFR BLD HPLC: 5.5 % (ref ?–5.7)
HDLC SERPL-MCNC: 48 MG/DL (ref 40–59)
LDLC SERPL CALC-MCNC: 133 MG/DL (ref ?–100)
M PROTEIN MFR SERPL ELPH: 8.5 G/DL (ref 6.4–8.2)
NONHDLC SERPL-MCNC: 167 MG/DL (ref ?–130)
OSMOLALITY SERPL CALC.SUM OF ELEC: 289 MOSM/KG (ref 275–295)
PATIENT FASTING Y/N/NP: YES
PATIENT FASTING Y/N/NP: YES
POTASSIUM SERPL-SCNC: 3.7 MMOL/L (ref 3.5–5.1)
SODIUM SERPL-SCNC: 140 MMOL/L (ref 136–145)
THYROGLOB SERPL-MCNC: >500 U/ML (ref ?–60)
THYROPEROXIDASE AB SERPL-ACNC: 5091 U/ML (ref ?–60)
TRIGL SERPL-MCNC: 170 MG/DL (ref 30–149)
TSI SER-ACNC: 3.74 MIU/ML (ref 0.36–3.74)
VIT D+METAB SERPL-MCNC: 24.2 NG/ML (ref 30–100)
VLDLC SERPL CALC-MCNC: 34 MG/DL (ref 0–30)

## 2020-01-17 PROCEDURE — 82306 VITAMIN D 25 HYDROXY: CPT

## 2020-01-17 PROCEDURE — 84443 ASSAY THYROID STIM HORMONE: CPT

## 2020-01-17 PROCEDURE — 80053 COMPREHEN METABOLIC PANEL: CPT

## 2020-01-17 PROCEDURE — 86376 MICROSOMAL ANTIBODY EACH: CPT

## 2020-01-17 PROCEDURE — 80074 ACUTE HEPATITIS PANEL: CPT | Performed by: FAMILY MEDICINE

## 2020-01-17 PROCEDURE — 80061 LIPID PANEL: CPT

## 2020-01-17 PROCEDURE — 86800 THYROGLOBULIN ANTIBODY: CPT

## 2020-01-17 PROCEDURE — 83036 HEMOGLOBIN GLYCOSYLATED A1C: CPT

## 2020-01-20 ENCOUNTER — OFFICE VISIT (OUTPATIENT)
Dept: FAMILY MEDICINE CLINIC | Facility: CLINIC | Age: 35
End: 2020-01-20
Payer: COMMERCIAL

## 2020-01-20 VITALS
RESPIRATION RATE: 16 BRPM | DIASTOLIC BLOOD PRESSURE: 80 MMHG | WEIGHT: 181.25 LBS | SYSTOLIC BLOOD PRESSURE: 130 MMHG | OXYGEN SATURATION: 99 % | BODY MASS INDEX: 28.45 KG/M2 | HEART RATE: 84 BPM | HEIGHT: 67 IN | TEMPERATURE: 98 F

## 2020-01-20 DIAGNOSIS — E78.2 MIXED HYPERLIPIDEMIA: Primary | ICD-10-CM

## 2020-01-20 DIAGNOSIS — R79.89 ELEVATED LFTS: ICD-10-CM

## 2020-01-20 DIAGNOSIS — E66.3 OVERWEIGHT (BMI 25.0-29.9): ICD-10-CM

## 2020-01-20 DIAGNOSIS — R79.89 ABNORMAL TSH: ICD-10-CM

## 2020-01-20 DIAGNOSIS — E55.9 VITAMIN D DEFICIENCY: ICD-10-CM

## 2020-01-20 DIAGNOSIS — R73.03 PREDIABETES: ICD-10-CM

## 2020-01-20 DIAGNOSIS — Z82.49 FAMILY HISTORY OF PREMATURE CAD: ICD-10-CM

## 2020-01-20 LAB
HAV IGM SER QL: NONREACTIVE
HBV CORE IGM SER QL: NONREACTIVE
HBV SURFACE AG SERPL QL IA: NONREACTIVE
HCV AB SERPL QL IA: NONREACTIVE

## 2020-01-20 PROCEDURE — 99214 OFFICE O/P EST MOD 30 MIN: CPT | Performed by: FAMILY MEDICINE

## 2020-01-20 RX ORDER — ROSUVASTATIN CALCIUM 5 MG/1
5 TABLET, COATED ORAL NIGHTLY
Qty: 30 TABLET | Refills: 2 | Status: SHIPPED | OUTPATIENT
Start: 2020-01-20 | End: 2020-05-03

## 2020-01-20 RX ORDER — FLUTICASONE PROPIONATE 50 MCG
2 SPRAY, SUSPENSION (ML) NASAL DAILY
COMMUNITY

## 2020-01-21 NOTE — PROGRESS NOTES
HPI:    Patient ID: Corin Villarreal is a 37year old male. HPI   34yr old male presents for f/u on recent labs. Previous labs demonstrated HL, prediabetes and abnl TSH. Had repeat testing and here for results.      Review of Systems   Constitutional total) by mouth nightly. Dispense: 30 tablet; Refill: 2    3. Elevated LFTs  - mild elevation in LFTs  - will check hepatitis panel and RUQ US  - monitor   - HEPATITIS PANEL, ACUTE (4)  - US ABDOMEN LIMITED (CPT=76705); Future    4.  Prediabetes  - a1c 5.5

## 2020-04-06 DIAGNOSIS — E66.9 OBESITY (BMI 30-39.9): ICD-10-CM

## 2020-04-06 DIAGNOSIS — Z51.81 THERAPEUTIC DRUG MONITORING: ICD-10-CM

## 2020-04-06 RX ORDER — PHENTERMINE HYDROCHLORIDE 37.5 MG/1
37.5 CAPSULE ORAL EVERY MORNING
Qty: 30 CAPSULE | Refills: 0 | Status: SHIPPED | OUTPATIENT
Start: 2020-04-06 | End: 2020-05-06

## 2020-05-03 DIAGNOSIS — E66.9 OBESITY (BMI 30-39.9): ICD-10-CM

## 2020-05-03 DIAGNOSIS — Z51.81 THERAPEUTIC DRUG MONITORING: ICD-10-CM

## 2020-05-03 DIAGNOSIS — L21.9 SEBORRHEIC DERMATITIS: ICD-10-CM

## 2020-05-03 DIAGNOSIS — Z82.49 FAMILY HISTORY OF PREMATURE CAD: ICD-10-CM

## 2020-05-03 DIAGNOSIS — E78.2 MIXED HYPERLIPIDEMIA: ICD-10-CM

## 2020-05-03 RX ORDER — PHENTERMINE HYDROCHLORIDE 37.5 MG/1
37.5 CAPSULE ORAL EVERY MORNING
Qty: 30 CAPSULE | Refills: 0 | Status: CANCELLED | OUTPATIENT
Start: 2020-05-03

## 2020-05-05 DIAGNOSIS — L21.9 SEBORRHEIC DERMATITIS: ICD-10-CM

## 2020-05-05 DIAGNOSIS — E66.9 OBESITY (BMI 30-39.9): ICD-10-CM

## 2020-05-05 DIAGNOSIS — E78.2 MIXED HYPERLIPIDEMIA: ICD-10-CM

## 2020-05-05 DIAGNOSIS — Z82.49 FAMILY HISTORY OF PREMATURE CAD: ICD-10-CM

## 2020-05-05 DIAGNOSIS — Z51.81 THERAPEUTIC DRUG MONITORING: ICD-10-CM

## 2020-05-05 RX ORDER — PHENTERMINE HYDROCHLORIDE 37.5 MG/1
37.5 CAPSULE ORAL EVERY MORNING
Qty: 30 CAPSULE | Refills: 0 | Status: CANCELLED | OUTPATIENT
Start: 2020-05-05

## 2020-05-05 NOTE — TELEPHONE ENCOUNTER
LOV 1/20/2020    Asked at that time to follow up as below    RTC in 3mo, sooner if needed     Please call to schedule visit.

## 2020-05-06 ENCOUNTER — TELEMEDICINE (OUTPATIENT)
Dept: FAMILY MEDICINE CLINIC | Facility: CLINIC | Age: 35
End: 2020-05-06

## 2020-05-06 VITALS — WEIGHT: 189 LBS | BODY MASS INDEX: 30 KG/M2

## 2020-05-06 DIAGNOSIS — Z51.81 THERAPEUTIC DRUG MONITORING: ICD-10-CM

## 2020-05-06 PROCEDURE — 99213 OFFICE O/P EST LOW 20 MIN: CPT | Performed by: FAMILY MEDICINE

## 2020-05-06 RX ORDER — PHENTERMINE HYDROCHLORIDE 37.5 MG/1
37.5 CAPSULE ORAL EVERY MORNING
Qty: 30 CAPSULE | Refills: 0 | Status: SHIPPED | OUTPATIENT
Start: 2020-05-06 | End: 2020-10-23

## 2020-05-06 NOTE — PROGRESS NOTES
Video visit  Please note that the following visit was completed using two-way, real-time interactive audio and/or video communication.   This has been done in good lu to provide continuity of care in the best interest of the provider-patient relationship scalp twice weekly 120 mL 2   • ergocalciferol 06755 units Oral Cap Take 1 capsule (50,000 Units total) by mouth once a week.   2     Allergies:  Shrimp                  ANAPHYLAXIS   PHYSICAL EXAM:   Physical Exam   Constitutional: He is oriented to person

## 2020-05-07 RX ORDER — ROSUVASTATIN CALCIUM 5 MG/1
5 TABLET, COATED ORAL NIGHTLY
Qty: 30 TABLET | Refills: 2 | OUTPATIENT
Start: 2020-05-07

## 2020-05-07 RX ORDER — KETOCONAZOLE 20 MG/ML
SHAMPOO TOPICAL
Qty: 120 ML | Refills: 2 | OUTPATIENT
Start: 2020-05-07

## 2020-05-07 RX ORDER — KETOCONAZOLE 20 MG/ML
SHAMPOO TOPICAL
Qty: 120 ML | Refills: 2 | Status: SHIPPED | OUTPATIENT
Start: 2020-05-07

## 2020-05-07 RX ORDER — ROSUVASTATIN CALCIUM 5 MG/1
5 TABLET, COATED ORAL NIGHTLY
Qty: 30 TABLET | Refills: 2 | Status: SHIPPED | OUTPATIENT
Start: 2020-05-07 | End: 2020-10-23

## 2020-10-23 ENCOUNTER — OFFICE VISIT (OUTPATIENT)
Dept: FAMILY MEDICINE CLINIC | Facility: CLINIC | Age: 35
End: 2020-10-23
Payer: COMMERCIAL

## 2020-10-23 VITALS
DIASTOLIC BLOOD PRESSURE: 84 MMHG | WEIGHT: 200.13 LBS | RESPIRATION RATE: 16 BRPM | TEMPERATURE: 97 F | BODY MASS INDEX: 31.41 KG/M2 | HEIGHT: 67 IN | OXYGEN SATURATION: 99 % | HEART RATE: 84 BPM | SYSTOLIC BLOOD PRESSURE: 126 MMHG

## 2020-10-23 DIAGNOSIS — B35.4 TINEA CORPORIS: ICD-10-CM

## 2020-10-23 DIAGNOSIS — R21 RASH AND NONSPECIFIC SKIN ERUPTION: Primary | ICD-10-CM

## 2020-10-23 PROCEDURE — 99213 OFFICE O/P EST LOW 20 MIN: CPT | Performed by: FAMILY MEDICINE

## 2020-10-23 PROCEDURE — 3079F DIAST BP 80-89 MM HG: CPT | Performed by: FAMILY MEDICINE

## 2020-10-23 PROCEDURE — 3008F BODY MASS INDEX DOCD: CPT | Performed by: FAMILY MEDICINE

## 2020-10-23 PROCEDURE — 3074F SYST BP LT 130 MM HG: CPT | Performed by: FAMILY MEDICINE

## 2020-10-23 NOTE — PROGRESS NOTES
HPI:    Patient ID: Francesca Curling is a 38year old male. HPI   35yr old male presents for c/o itchy, rash under his abdominal fold that has been present for at least the past month. Has tried terbinafine and hydrocortisone otc without any relief.

## 2020-12-02 ENCOUNTER — OFFICE VISIT (OUTPATIENT)
Dept: FAMILY MEDICINE CLINIC | Facility: CLINIC | Age: 35
End: 2020-12-02
Payer: COMMERCIAL

## 2020-12-02 VITALS
OXYGEN SATURATION: 99 % | WEIGHT: 200 LBS | DIASTOLIC BLOOD PRESSURE: 82 MMHG | RESPIRATION RATE: 16 BRPM | BODY MASS INDEX: 31.39 KG/M2 | HEIGHT: 67 IN | HEART RATE: 83 BPM | TEMPERATURE: 98 F | SYSTOLIC BLOOD PRESSURE: 124 MMHG

## 2020-12-02 DIAGNOSIS — Z00.00 ROUTINE GENERAL MEDICAL EXAMINATION AT A HEALTH CARE FACILITY: Primary | ICD-10-CM

## 2020-12-02 DIAGNOSIS — E78.2 MIXED HYPERLIPIDEMIA: ICD-10-CM

## 2020-12-02 DIAGNOSIS — E66.9 OBESITY (BMI 30-39.9): ICD-10-CM

## 2020-12-02 DIAGNOSIS — Z00.00 LABORATORY EXAM ORDERED AS PART OF ROUTINE GENERAL MEDICAL EXAMINATION: ICD-10-CM

## 2020-12-02 DIAGNOSIS — R79.89 ABNORMAL TSH: ICD-10-CM

## 2020-12-02 DIAGNOSIS — E55.9 VITAMIN D DEFICIENCY: ICD-10-CM

## 2020-12-02 PROCEDURE — 99395 PREV VISIT EST AGE 18-39: CPT | Performed by: FAMILY MEDICINE

## 2020-12-02 PROCEDURE — 3008F BODY MASS INDEX DOCD: CPT | Performed by: FAMILY MEDICINE

## 2020-12-02 PROCEDURE — 3074F SYST BP LT 130 MM HG: CPT | Performed by: FAMILY MEDICINE

## 2020-12-02 PROCEDURE — 3079F DIAST BP 80-89 MM HG: CPT | Performed by: FAMILY MEDICINE

## 2020-12-02 NOTE — PROGRESS NOTES
Patient presents with:  Physical      HPI:  35yr old obese, male presents for his annual physical. Doing well overall. Obesity, has not been working on diet nor exercising.    Will be starting to work full-time at [employer].      [address] Nasal Suspension 2 sprays by Each Nare route daily. Shrimp                  ANAPHYLAXIS    Health Maintenance:    1. Colon cancer screening: n/a  2. Prostate screening: n/a  3.  Immunizations:   Immunization History  Administered            Date(s Placed This Encounter      CBC With Diff      CMP      Lipid      TSH W Reflex To Free T4      Vitamin D, 25-Hydroxy      Meds & Refills for this Visit:  Requested Prescriptions      No prescriptions requested or ordered in this encounter       Imaging & C

## 2020-12-17 ENCOUNTER — IMMUNIZATION (OUTPATIENT)
Dept: LAB | Facility: HOSPITAL | Age: 35
End: 2020-12-17
Attending: PREVENTIVE MEDICINE
Payer: COMMERCIAL

## 2020-12-17 DIAGNOSIS — Z23 NEED FOR VACCINATION: ICD-10-CM

## 2020-12-17 PROCEDURE — 0001A PFIZER-BIONTECH COVID-19 VACCINE: CPT

## 2020-12-23 ENCOUNTER — TELEPHONE (OUTPATIENT)
Dept: FAMILY MEDICINE CLINIC | Facility: CLINIC | Age: 35
End: 2020-12-23

## 2020-12-23 ENCOUNTER — LAB ENCOUNTER (OUTPATIENT)
Dept: LAB | Facility: HOSPITAL | Age: 35
End: 2020-12-23
Attending: FAMILY MEDICINE
Payer: COMMERCIAL

## 2020-12-23 DIAGNOSIS — Z11.1 SCREENING FOR TUBERCULOSIS: Primary | ICD-10-CM

## 2020-12-23 DIAGNOSIS — Z11.1 SCREENING EXAMINATION FOR PULMONARY TUBERCULOSIS: Primary | ICD-10-CM

## 2020-12-23 PROCEDURE — 86480 TB TEST CELL IMMUN MEASURE: CPT

## 2020-12-23 PROCEDURE — 36415 COLL VENOUS BLD VENIPUNCTURE: CPT

## 2020-12-23 NOTE — TELEPHONE ENCOUNTER
Patient stated he will need the ?QuantiFERON-TB Gold test done for THE [employer] [facility] employer. Pt stated he would like to get test done as soon as possible. Patient would like to be notified as soon as order has been placed.

## 2021-01-07 ENCOUNTER — IMMUNIZATION (OUTPATIENT)
Dept: LAB | Facility: HOSPITAL | Age: 36
End: 2021-01-07
Attending: PREVENTIVE MEDICINE

## 2021-01-07 DIAGNOSIS — Z23 NEED FOR VACCINATION: ICD-10-CM

## 2021-01-07 PROCEDURE — 0002A SARSCOV2 VAC 30MCG/0.3ML IM: CPT

## 2021-05-05 ENCOUNTER — LAB ENCOUNTER (OUTPATIENT)
Dept: LAB | Age: 36
End: 2021-05-05
Attending: FAMILY MEDICINE
Payer: COMMERCIAL

## 2021-05-05 DIAGNOSIS — R79.89 ABNORMAL TSH: ICD-10-CM

## 2021-05-05 DIAGNOSIS — Z00.00 LABORATORY EXAM ORDERED AS PART OF ROUTINE GENERAL MEDICAL EXAMINATION: ICD-10-CM

## 2021-05-05 DIAGNOSIS — E55.9 VITAMIN D DEFICIENCY: ICD-10-CM

## 2021-05-05 DIAGNOSIS — E78.2 MIXED HYPERLIPIDEMIA: ICD-10-CM

## 2021-05-05 PROCEDURE — 82306 VITAMIN D 25 HYDROXY: CPT

## 2021-05-05 PROCEDURE — 84443 ASSAY THYROID STIM HORMONE: CPT

## 2021-05-05 PROCEDURE — 36415 COLL VENOUS BLD VENIPUNCTURE: CPT

## 2021-05-05 PROCEDURE — 84439 ASSAY OF FREE THYROXINE: CPT

## 2021-05-05 PROCEDURE — 80053 COMPREHEN METABOLIC PANEL: CPT

## 2021-05-05 PROCEDURE — 85025 COMPLETE CBC W/AUTO DIFF WBC: CPT

## 2021-05-05 PROCEDURE — 80061 LIPID PANEL: CPT

## 2021-08-23 ENCOUNTER — PATIENT MESSAGE (OUTPATIENT)
Dept: FAMILY MEDICINE CLINIC | Facility: CLINIC | Age: 36
End: 2021-08-23

## 2021-08-24 NOTE — TELEPHONE ENCOUNTER
From: Niko Garcianos III  To: Naila DO Silvia  Sent: 8/23/2021 9:30 PM CDT  Subject: Non-Urgent Medical Question    hi doc. i have gained so much weight again. i keep eating out. 218 lbs now. im always sleepy.  i think i need your help again with phenter

## 2024-05-17 ENCOUNTER — TELEPHONE (OUTPATIENT)
Dept: FAMILY MEDICINE CLINIC | Facility: CLINIC | Age: 39
End: 2024-05-17

## 2024-05-23 ENCOUNTER — PATIENT OUTREACH (OUTPATIENT)
Dept: CASE MANAGEMENT | Age: 39
End: 2024-05-23

## 2024-05-23 NOTE — PROCEDURES
The office order for PCP removal request is Approved and finalized on May 23, 2024.    Thanks,  [facility] Team

## 2024-07-30 ENCOUNTER — APPOINTMENT (OUTPATIENT)
Dept: CT IMAGING | Facility: HOSPITAL | Age: 39
End: 2024-07-30
Attending: EMERGENCY MEDICINE
Payer: COMMERCIAL

## 2024-07-30 ENCOUNTER — HOSPITAL ENCOUNTER (EMERGENCY)
Facility: HOSPITAL | Age: 39
Discharge: HOME | End: 2024-07-30
Attending: EMERGENCY MEDICINE
Payer: COMMERCIAL

## 2024-07-30 VITALS
TEMPERATURE: 98 F | BODY MASS INDEX: 31 KG/M2 | OXYGEN SATURATION: 96 % | WEIGHT: 198.44 LBS | DIASTOLIC BLOOD PRESSURE: 94 MMHG | RESPIRATION RATE: 16 BRPM | SYSTOLIC BLOOD PRESSURE: 172 MMHG | HEART RATE: 90 BPM

## 2024-07-30 DIAGNOSIS — N20.0 KIDNEY STONE: Primary | ICD-10-CM

## 2024-07-30 LAB
ALBUMIN SERPL-MCNC: 4.6 G/DL (ref 3.2–4.8)
ALBUMIN/GLOB SERPL: 1.5 {RATIO} (ref 1–2)
ALP LIVER SERPL-CCNC: 82 U/L
ALT SERPL-CCNC: 67 U/L
ANION GAP SERPL CALC-SCNC: 7 MMOL/L (ref 0–18)
AST SERPL-CCNC: 41 U/L (ref ?–34)
BASOPHILS # BLD AUTO: 0.1 X10(3) UL (ref 0–0.2)
BASOPHILS NFR BLD AUTO: 1.2 %
BILIRUB SERPL-MCNC: 0.4 MG/DL (ref 0.3–1.2)
BILIRUB UR QL STRIP.AUTO: NEGATIVE
BUN BLD-MCNC: 14 MG/DL (ref 9–23)
CALCIUM BLD-MCNC: 9.2 MG/DL (ref 8.7–10.4)
CHLORIDE SERPL-SCNC: 101 MMOL/L (ref 98–112)
CLARITY UR REFRACT.AUTO: CLEAR
CO2 SERPL-SCNC: 26 MMOL/L (ref 21–32)
CREAT BLD-MCNC: 1.22 MG/DL
EGFRCR SERPLBLD CKD-EPI 2021: 76 ML/MIN/1.73M2 (ref 60–?)
EOSINOPHIL # BLD AUTO: 0.19 X10(3) UL (ref 0–0.7)
EOSINOPHIL NFR BLD AUTO: 2.3 %
ERYTHROCYTE [DISTWIDTH] IN BLOOD BY AUTOMATED COUNT: 13.3 %
GLOBULIN PLAS-MCNC: 3.1 G/DL (ref 2–3.5)
GLUCOSE BLD-MCNC: 132 MG/DL (ref 70–99)
GLUCOSE UR STRIP.AUTO-MCNC: NORMAL MG/DL
HCT VFR BLD AUTO: 45 %
HGB BLD-MCNC: 15.5 G/DL
IMM GRANULOCYTES # BLD AUTO: 0.04 X10(3) UL (ref 0–1)
IMM GRANULOCYTES NFR BLD: 0.5 %
LEUKOCYTE ESTERASE UR QL STRIP.AUTO: NEGATIVE
LYMPHOCYTES # BLD AUTO: 2.25 X10(3) UL (ref 1–4)
LYMPHOCYTES NFR BLD AUTO: 27.1 %
MCH RBC QN AUTO: 29.1 PG (ref 26–34)
MCHC RBC AUTO-ENTMCNC: 34.4 G/DL (ref 31–37)
MCV RBC AUTO: 84.6 FL
MONOCYTES # BLD AUTO: 0.74 X10(3) UL (ref 0.1–1)
MONOCYTES NFR BLD AUTO: 8.9 %
NEUTROPHILS # BLD AUTO: 4.99 X10 (3) UL (ref 1.5–7.7)
NEUTROPHILS # BLD AUTO: 4.99 X10(3) UL (ref 1.5–7.7)
NEUTROPHILS NFR BLD AUTO: 60 %
NITRITE UR QL STRIP.AUTO: NEGATIVE
OSMOLALITY SERPL CALC.SUM OF ELEC: 280 MOSM/KG (ref 275–295)
PH UR STRIP.AUTO: 6 [PH] (ref 5–8)
PLATELET # BLD AUTO: 236 10(3)UL (ref 150–450)
POTASSIUM SERPL-SCNC: 3.4 MMOL/L (ref 3.5–5.1)
PROT SERPL-MCNC: 7.7 G/DL (ref 5.7–8.2)
PROT UR STRIP.AUTO-MCNC: 20 MG/DL
RBC # BLD AUTO: 5.32 X10(6)UL
RBC #/AREA URNS AUTO: >10 /HPF
SODIUM SERPL-SCNC: 134 MMOL/L (ref 136–145)
SP GR UR STRIP.AUTO: 1.02 (ref 1–1.03)
UROBILINOGEN UR STRIP.AUTO-MCNC: NORMAL MG/DL
WBC # BLD AUTO: 8.3 X10(3) UL (ref 4–11)

## 2024-07-30 PROCEDURE — 96374 THER/PROPH/DIAG INJ IV PUSH: CPT

## 2024-07-30 PROCEDURE — 85025 COMPLETE CBC W/AUTO DIFF WBC: CPT | Performed by: EMERGENCY MEDICINE

## 2024-07-30 PROCEDURE — 81001 URINALYSIS AUTO W/SCOPE: CPT | Performed by: EMERGENCY MEDICINE

## 2024-07-30 PROCEDURE — 80053 COMPREHEN METABOLIC PANEL: CPT | Performed by: EMERGENCY MEDICINE

## 2024-07-30 PROCEDURE — 99284 EMERGENCY DEPT VISIT MOD MDM: CPT

## 2024-07-30 PROCEDURE — 96375 TX/PRO/DX INJ NEW DRUG ADDON: CPT

## 2024-07-30 PROCEDURE — 99285 EMERGENCY DEPT VISIT HI MDM: CPT

## 2024-07-30 PROCEDURE — 96361 HYDRATE IV INFUSION ADD-ON: CPT

## 2024-07-30 PROCEDURE — 74176 CT ABD & PELVIS W/O CONTRAST: CPT | Performed by: EMERGENCY MEDICINE

## 2024-07-30 PROCEDURE — 96376 TX/PRO/DX INJ SAME DRUG ADON: CPT

## 2024-07-30 RX ORDER — ONDANSETRON 4 MG/1
4 TABLET, ORALLY DISINTEGRATING ORAL EVERY 4 HOURS PRN
Qty: 10 TABLET | Refills: 0 | Status: SHIPPED | OUTPATIENT
Start: 2024-07-30 | End: 2024-08-05

## 2024-07-30 RX ORDER — HYDROCODONE BITARTRATE AND ACETAMINOPHEN 5; 325 MG/1; MG/1
1-2 TABLET ORAL EVERY 6 HOURS PRN
Qty: 10 TABLET | Refills: 0 | Status: SHIPPED | OUTPATIENT
Start: 2024-07-30 | End: 2024-08-04

## 2024-07-30 RX ORDER — METOCLOPRAMIDE HYDROCHLORIDE 5 MG/ML
10 INJECTION INTRAMUSCULAR; INTRAVENOUS ONCE
Status: COMPLETED | OUTPATIENT
Start: 2024-07-30 | End: 2024-07-30

## 2024-07-30 RX ORDER — ONDANSETRON 2 MG/ML
4 INJECTION INTRAMUSCULAR; INTRAVENOUS ONCE
Status: COMPLETED | OUTPATIENT
Start: 2024-07-30 | End: 2024-07-30

## 2024-07-30 RX ORDER — HYDROMORPHONE HYDROCHLORIDE 1 MG/ML
1 INJECTION, SOLUTION INTRAMUSCULAR; INTRAVENOUS; SUBCUTANEOUS ONCE
Status: COMPLETED | OUTPATIENT
Start: 2024-07-30 | End: 2024-07-30

## 2024-07-30 RX ORDER — DIPHENHYDRAMINE HYDROCHLORIDE 50 MG/ML
25 INJECTION INTRAMUSCULAR; INTRAVENOUS ONCE
Status: COMPLETED | OUTPATIENT
Start: 2024-07-30 | End: 2024-07-30

## 2024-07-30 RX ORDER — KETOROLAC TROMETHAMINE 15 MG/ML
15 INJECTION, SOLUTION INTRAMUSCULAR; INTRAVENOUS ONCE
Status: COMPLETED | OUTPATIENT
Start: 2024-07-30 | End: 2024-07-30

## 2024-07-30 RX ORDER — TAMSULOSIN HYDROCHLORIDE 0.4 MG/1
0.4 CAPSULE ORAL DAILY
Qty: 7 CAPSULE | Refills: 0 | Status: SHIPPED | OUTPATIENT
Start: 2024-07-30 | End: 2024-08-05

## 2024-07-30 NOTE — ED INITIAL ASSESSMENT (HPI)
Right flank pain since last evening. Nausea and sweating noted. Denies blood in urine. No radiation.

## 2024-07-30 NOTE — ED QUICK NOTES
Feeling better on reevaluation. Test report and plan of care explained by dr. Hernandez. Verbalizing understanding

## 2024-07-30 NOTE — DISCHARGE INSTRUCTIONS
Drink plenty of fluids.  Medications as prescribed.  Can take ibuprofen 600 mg 3 times a day with food.  If you take the Norco, no driving or Tylenol.  Can strain urine.  If stone does not pass, follow-up with urology in the next 3 to 4 days.  Not follow-up with primary care.  Recheck blood pressure with primary care.

## 2024-08-05 ENCOUNTER — OFFICE VISIT (OUTPATIENT)
Facility: LOCATION | Age: 39
End: 2024-08-05
Payer: COMMERCIAL

## 2024-08-05 DIAGNOSIS — R10.9 RIGHT FLANK PAIN: ICD-10-CM

## 2024-08-05 DIAGNOSIS — N13.2 URETERAL STONE WITH HYDRONEPHROSIS: Primary | ICD-10-CM

## 2024-08-05 DIAGNOSIS — R31.29 MICROHEMATURIA: ICD-10-CM

## 2024-08-05 RX ORDER — HYDROCODONE BITARTRATE AND ACETAMINOPHEN 5; 325 MG/1; MG/1
1 TABLET ORAL EVERY 6 HOURS PRN
Qty: 10 TABLET | Refills: 0 | Status: SHIPPED | OUTPATIENT
Start: 2024-08-05

## 2024-08-05 RX ORDER — ONDANSETRON 4 MG/1
4 TABLET, ORALLY DISINTEGRATING ORAL EVERY 4 HOURS PRN
Qty: 10 TABLET | Refills: 0 | Status: SHIPPED | OUTPATIENT
Start: 2024-08-05 | End: 2024-08-12

## 2024-08-05 RX ORDER — VALACYCLOVIR HYDROCHLORIDE 1 G/1
1000 TABLET, FILM COATED ORAL 3 TIMES DAILY
COMMUNITY
Start: 2024-07-29

## 2024-08-05 RX ORDER — LISINOPRIL AND HYDROCHLOROTHIAZIDE 20; 12.5 MG/1; MG/1
1 TABLET ORAL DAILY
COMMUNITY
Start: 2024-08-03

## 2024-08-05 RX ORDER — FLUTICASONE FUROATE AND VILANTEROL TRIFENATATE 100; 25 UG/1; UG/1
POWDER RESPIRATORY (INHALATION)
COMMUNITY
Start: 2024-07-07

## 2024-08-05 RX ORDER — TAMSULOSIN HYDROCHLORIDE 0.4 MG/1
0.4 CAPSULE ORAL DAILY
Qty: 30 CAPSULE | Refills: 0 | Status: SHIPPED | OUTPATIENT
Start: 2024-08-05 | End: 2024-09-04

## 2024-08-05 RX ORDER — PANTOPRAZOLE SODIUM 40 MG/1
40 TABLET, DELAYED RELEASE ORAL DAILY
COMMUNITY
Start: 2024-07-13

## 2024-08-05 NOTE — PROGRESS NOTES
[facility], [address] [place] [address]    Urology Consult Note    History of Present Illness:   Patient is a 39 year old male with hx of allergic rhinitis, who presents today for consultation from Dr. Tijerina's office for ureteral stone.    Patient presented to the  ED 7/30/24 for right flank pain with associated nausea and gross hematuria for 4 days. CT scan showed 4 mm right UVJ stone with hydronephrosis and small left renal cyst. Scr 1.22 and normal blood counts. Urinalysis >10 RBC/hpf, no leuks. He was discharged home on tamsulosin. Has not been straining urine, but has not seen any fragment pass. Last episode of pain today.    He is currently doing better without current complaint, pain less and intermittent. Did take Norco this morning. On tamsulosin.    Fluids <1 L water, has increased since diagnosis  High salt    Prior history of stones, passed 6-8 years ago. CT 2016 3mm left UVJ stone. No analysis, no stone collected.    PA uncle PCA.   No FH of stones.     Unable to provide urine sample.    HISTORY:  Past Medical History:    Allergic rhinitis      No past surgical history on file.   Family History   Problem Relation Age of Onset    Asthma Father     Cancer Mother 55        leiomyosarcoma    Cancer Maternal Grandmother         uterine ca    Heart Disorder Maternal Grandfather     Stroke Maternal Grandfather       Social History:   Social History     Socioeconomic History    Marital status:    Tobacco Use    Smoking status: Never    Smokeless tobacco: Never   Substance and Sexual Activity    Alcohol use: No    Drug use: No        Allergies  Allergies   Allergen Reactions    Shrimp ANAPHYLAXIS       Review of Systems:   A 10-point review of systems was completed and is negative other than as noted above.    Physical Exam:   There were no vitals taken for this visit.    GENERAL APPEARANCE: well developed, well nourished, in no acute distress  NEUROLOGIC: no localizing neurologic  signs, alert and oriented x 3, converses appropriately  HEAD: atraumatic, normocephalic  EYES: sclera non-icteric  ORAL CAVITY: mucosa moist  NECK/THYROID: no obvious masses or goiter  LUNGS: non-labored breathing  EXTREMITIES: warm, well-perfused. No clubbing, cyanosis or edema.  SKIN: no obvious rashes    Results:     Laboratory Data:  Lab Results   Component Value Date    WBC 8.3 07/30/2024    HGB 15.5 07/30/2024    .0 07/30/2024     Lab Results   Component Value Date     (L) 07/30/2024    K 3.4 (L) 07/30/2024     07/30/2024    CO2 26.0 07/30/2024    BUN 14 07/30/2024     (H) 07/30/2024    GFRAA 102 05/05/2021    AST 41 (H) 07/30/2024    ALT 67 (H) 07/30/2024    TP 7.7 07/30/2024    ALB 4.6 07/30/2024    CA 9.2 07/30/2024       Urinalysis Results (last three years):  Recent Labs     07/30/24  0611   COLORUR Light-Yellow   CLARITY Clear   SPECGRAVITY 1.023   PHURINE 6.0   PROUR 20*   GLUUR Normal   KETUR Trace*   BILUR Negative   BLOODURINE 3+*   NITRITE Negative   UROBILINOGEN Normal   LEUUR Negative   WBCUR 1-5   RBCUR >10*   BACUR None Seen       Urine Culture Results (last three years):  No results found for: \"URINECUL\"    Imaging  CT ABDOMEN+PELVIS KIDNEYSTONE 2D RNDR(NO IV,NO ORAL)(CPT=74176)    Result Date: 7/30/2024  PROCEDURE:  CT ABDOMEN+PELVIS KIDNEYSTONE 2D RNDR(NO IV,NO ORAL)(CPT=74176)  COMPARISON:  NICKI BERNAL, CT ABDOMEN+PELVIS KIDNEYSTONE 2D RNDR(NO IV,NO ORAL)(CPT=74176), 4/16/2016, 3:41 AM.  INDICATIONS:  right flank pain  TECHNIQUE:  Unenhanced multislice CT scanning from above the kidneys to below the urinary bladder.  2D rendering are generated on the CT scanner workstation to localize potential stones in the cranio-caudal plane.  Dose reduction techniques were used. Dose information is transmitted to the ACR (American College of Radiology) NRDR (National Radiology Data Registry) which includes the Dose Index Registry.  PATIENT STATED HISTORY: (As transcribed by  Technologist)  Right flank pain. history of kidney stones    FINDINGS:  Evaluation of the visceral organs is limited due to the lack of intravenous contrast.  LUNG BASE:  Scattered atelectasis. LIVER:  Unremarkable. BILIARY:  Decompressed gallbladder. SPLEEN:  Unremarkable. PANCREAS:  Unremarkable. ADRENALS:  Unremarkable. KIDNEYS:  There is a 4 mm stone at the right UVJ with mild right hydroureteronephrosis.  Small cyst at the mid left kidney which statistically does not warrant follow-up imaging. AORTA/VASCULAR:  Unremarkable. RETROPERITONEUM:  Unremarkable. BOWEL/MESENTERY:  Unremarkable.  Unremarkable appendix. ABDOMINAL WALL:  Tiny fat containing umbilical hernia. PELVIC ORGANS:  Small outpouching at the superior anterior bladder may represent a small urachal remnant, diverticulum, or partially distended bladder. LYMPH NODES:  Unremarkable. BONES:  Unremarkable. OTHER:  None.            CONCLUSION:  There is a 4 mm stone at the right UVJ with mild right hydroureteronephrosis.    LOCATION:  [facility]   Dictated by (CST): Stromberg, LeRoy, MD on 7/30/2024 at 6:10 AM     Finalized by (CST): Stromberg, LeRoy, MD on 7/30/2024 at 6:17 AM           Impression:     Patient is a 39 year old male  with hx of allergic rhinitis, who presents today for consultation from Dr. Tijerina's office for ureteral stone.    Reviewed imaging, no additional stones, only right UVJ stone with mild hydronephrosis. Reviewed lab and urinalysis results. No repeat UA today.  Surgical risks, benefits and alternatives discussed.    For now will continue MET with tamsulosin.   If fevers, intractable pain or vomiting to seek attention.     We discussed stone prevention strategies at today's visit and I provided and reviewed educational materials for this. I recommend drinking at least 40-60 ounces of water per day or enough water to keep urine clear. I also recommend the patient avoid a high sodium diet.     Recommendations:  Continue tamsulosin,  refill sent. Strain all urine. If no passage in the 10-14 days follow up in the office for repeat UA/labs and evaluation.    Thank you very much for this consult. Please call if there are any questions or concerns.     Urmila Conner PA-C  Urology  [facility]    Date: 8/5/2024

## 2024-08-05 NOTE — PATIENT INSTRUCTIONS
General Recommendations to Avoid Future Kidney Stones    1. Drink enough water to produce 2 liters of clear urine daily. You may need to use a container at first to measure how much you are actually producing and increase your intake accordingly. Try to start the day off with a large glass of water as we all wake up dehydrated in the morning.    2. Add lemon or lime juice to your water. These juices contain citrate which naturally inhibits stone formation. An easy way to do this is using sugar free lemonade mix (ie Crystal Light or True Lemon (if you prefer to avoid aspartame))    3. Avoid salty foods such as prepackaged and fast foods, and do not add salt to foods. Try to limit sodium intake to 2500mg maximum.     4. Decrease phosphorus (soda) and caffeine.     5. Limit intake of the following group of foods to one serving daily: spinach, nuts (especially almonds), tea (especially black tea), chocolate, and potatoes.    6. Limit intake of Vitamin C supplements to less than 1000 mg daily.    7. Limit intake of animal protein (beef, chicken, turkey, fish, pork) to one serving daily. A good rule for portion size is no more meat than will fit in the palm of your hand.    8. Limit roasted nuts to 2-3 servings per week.    9.  Maintain 1-2 servings of dairy products daily (1 serving = 1 glass of milk, 2 slices of cheese, 1 scoop of ice cream, or 1 cup of yogurt). Try to decrease cheese intake as it may increase kidney stone risk compared to other dairy products. Do not eliminate calcium from your diet as it is necessary for bone health.   -Females should try not to exceed 500mg per day  -Males should try not to exceed 750mg per day    10. Take a daily B complex vitamin or 100mg vitamin B6 daily. This may help to decrease oxalate in the urine.     11. Take a daily magnesium supplement of 300mg daily. Magnesium binds to oxalate in the same way that calcium does but is more soluble.  Magnesium will take the place of calcium  when binding with oxalate and be less prone to form stones.    12. Eat a low fat diet and exercise at least 30 minutes 3 times per week to try and maintain your ideal body weight. Being overweight is a risk factor for kidney stones too!

## (undated) NOTE — LETTER
01/04/21        Phil Perla III  [address]      Dear Arsenio Vega,    [facility] records indicate that you have outstanding lab work and or testing that was ordered for you and has not yet been completed:  Orders Placed This Encounter

## (undated) NOTE — MR AVS SNAPSHOT
[address] [facility] [employer]  [address] [facility] [place], [address] [address] [phone]               Thank you for choosing us for your health care visit with Lexii Perez DO.   We are glad to serve you and happy to your healthcare provider if there are any other foods you should avoid. · If you were prescribed antibiotics, take them as directed.   · Do not take anti-diarrhea medicines without asking your healthcare provider first.  Call your healthcare provider   Edgar · If symptoms are severe, rest at home for the next 24 hours or until you are feeling better. · Wash your hands with soap and water or use alcohol-based  to prevent the spread of infection. Wash your hands after touching anyone who is sick.   · Wa During the first 24 hours (the first full day), follow the diet below:  · Beverages. Sports drinks, soft drinks without caffeine, ginger ale, mineral water (plain or flavored), decaffeinated tea and coffee.  If you are very dehydrated, sports drinks aren't When to seek medical advice  Call your healthcare provider right away if any of these occur:  · Abdominal pain that gets worse  · Continued vomiting (unable to keep liquids down)  · Frequent diarrhea (more than 5 times a day)  · Blood in vomit or stool (bl You can access your MyChart to more actively manage your health care and view more details from this visit by going to [email or web address].   If you've recently had a stay at the Hospital you can access your discharge instructions in [address] by mahad